# Patient Record
Sex: MALE | Race: WHITE | NOT HISPANIC OR LATINO | URBAN - METROPOLITAN AREA
[De-identification: names, ages, dates, MRNs, and addresses within clinical notes are randomized per-mention and may not be internally consistent; named-entity substitution may affect disease eponyms.]

---

## 2023-11-02 ENCOUNTER — APPOINTMENT (RX ONLY)
Dept: URBAN - METROPOLITAN AREA CLINIC 12 | Facility: CLINIC | Age: 73
Setting detail: DERMATOLOGY
End: 2023-11-02

## 2023-11-02 DIAGNOSIS — L81.4 OTHER MELANIN HYPERPIGMENTATION: ICD-10-CM

## 2023-11-02 DIAGNOSIS — D22 MELANOCYTIC NEVI: ICD-10-CM

## 2023-11-02 DIAGNOSIS — L82.1 OTHER SEBORRHEIC KERATOSIS: ICD-10-CM

## 2023-11-02 DIAGNOSIS — D18.0 HEMANGIOMA: ICD-10-CM

## 2023-11-02 DIAGNOSIS — L57.0 ACTINIC KERATOSIS: ICD-10-CM

## 2023-11-02 PROBLEM — D18.01 HEMANGIOMA OF SKIN AND SUBCUTANEOUS TISSUE: Status: ACTIVE | Noted: 2023-11-02

## 2023-11-02 PROBLEM — D22.5 MELANOCYTIC NEVI OF TRUNK: Status: ACTIVE | Noted: 2023-11-02

## 2023-11-02 PROBLEM — D22.62 MELANOCYTIC NEVI OF LEFT UPPER LIMB, INCLUDING SHOULDER: Status: ACTIVE | Noted: 2023-11-02

## 2023-11-02 PROBLEM — D22.61 MELANOCYTIC NEVI OF RIGHT UPPER LIMB, INCLUDING SHOULDER: Status: ACTIVE | Noted: 2023-11-02

## 2023-11-02 PROCEDURE — ? FULL BODY SKIN EXAM

## 2023-11-02 PROCEDURE — 99203 OFFICE O/P NEW LOW 30 MIN: CPT | Mod: 25

## 2023-11-02 PROCEDURE — ? COUNSELING

## 2023-11-02 PROCEDURE — ? LIQUID NITROGEN

## 2023-11-02 PROCEDURE — ? SUNSCREEN RECOMMENDATIONS

## 2023-11-02 PROCEDURE — 17003 DESTRUCT PREMALG LES 2-14: CPT

## 2023-11-02 PROCEDURE — 17000 DESTRUCT PREMALG LESION: CPT

## 2023-11-02 ASSESSMENT — LOCATION SIMPLE DESCRIPTION DERM
LOCATION SIMPLE: ABDOMEN
LOCATION SIMPLE: RIGHT POSTERIOR UPPER ARM
LOCATION SIMPLE: RIGHT EAR
LOCATION SIMPLE: RIGHT SHOULDER
LOCATION SIMPLE: LEFT POSTERIOR UPPER ARM
LOCATION SIMPLE: RIGHT UPPER BACK
LOCATION SIMPLE: LEFT UPPER BACK

## 2023-11-02 ASSESSMENT — LOCATION DETAILED DESCRIPTION DERM
LOCATION DETAILED: RIGHT ANTERIOR SHOULDER
LOCATION DETAILED: RIGHT SUPERIOR UPPER BACK
LOCATION DETAILED: LEFT DISTAL POSTERIOR UPPER ARM
LOCATION DETAILED: RIGHT SUPERIOR HELIX
LOCATION DETAILED: LEFT RIB CAGE
LOCATION DETAILED: RIGHT DISTAL POSTERIOR UPPER ARM
LOCATION DETAILED: LEFT MID-UPPER BACK

## 2023-11-02 ASSESSMENT — LOCATION ZONE DERM
LOCATION ZONE: ARM
LOCATION ZONE: TRUNK
LOCATION ZONE: EAR

## 2023-11-02 NOTE — PROCEDURE: SUNSCREEN RECOMMENDATIONS
Products Recommended: SPF 30, reapplication every 2 hours or after swimming and sweating.\\nVit C Sunscreen\\nCeraVe AM\\nTinted SPF\\nBanana Boat Body
General Sunscreen Counseling: I recommended a broad spectrum sunscreen with a SPF of 30 or higher.  I explained that SPF 30 sunscreens block approximately 97 percent of the sun's harmful rays.  Sunscreens should be applied at least 15 minutes prior to expected sun exposure and then every 2 hours after that as long as sun exposure continues. If swimming or exercising sunscreen should be reapplied every 45 minutes to an hour after getting wet or sweating.  One ounce, or the equivalent of a shot glass full of sunscreen, is adequate to protect the skin not covered by a bathing suit. I also recommended a lip balm with a sunscreen as well. Sun protective clothing can be used in lieu of sunscreen but must be worn the entire time you are exposed to the sun's rays.  \\n
Detail Level: Detailed
stretcher

## 2023-11-02 NOTE — PROCEDURE: LIQUID NITROGEN
Duration Of Freeze Thaw-Cycle (Seconds): 6
Post-Care Instructions: I reviewed with the patient in detail post-care instructions. Patient is to wear sunprotection, and avoid picking at any of the treated lesions. Pt may apply Vaseline to crusted or scabbing areas.
Render In Bullet Format When Appropriate: No
Detail Level: Zone
Number Of Freeze-Thaw Cycles: 2 freeze-thaw cycles
Total Number Of Aks Treated: 2
Render Post-Care Instructions In Note?: yes
Consent: The patient's verbal and/or written consent was obtained including but not limited to risks of crusting, scabbing, blistering, scarring, darker or lighter pigmentary change, recurrence, incomplete removal and infection.

## 2024-11-06 ENCOUNTER — APPOINTMENT (RX ONLY)
Dept: URBAN - METROPOLITAN AREA CLINIC 12 | Facility: CLINIC | Age: 74
Setting detail: DERMATOLOGY
End: 2024-11-06

## 2024-11-06 DIAGNOSIS — L81.4 OTHER MELANIN HYPERPIGMENTATION: ICD-10-CM

## 2024-11-06 DIAGNOSIS — D22 MELANOCYTIC NEVI: ICD-10-CM

## 2024-11-06 DIAGNOSIS — L57.0 ACTINIC KERATOSIS: ICD-10-CM

## 2024-11-06 PROBLEM — D22.5 MELANOCYTIC NEVI OF TRUNK: Status: ACTIVE | Noted: 2024-11-06

## 2024-11-06 PROCEDURE — ? LIQUID NITROGEN

## 2024-11-06 PROCEDURE — 17003 DESTRUCT PREMALG LES 2-14: CPT

## 2024-11-06 PROCEDURE — ? COUNSELING

## 2024-11-06 PROCEDURE — ? FULL BODY SKIN EXAM

## 2024-11-06 PROCEDURE — 17000 DESTRUCT PREMALG LESION: CPT

## 2024-11-06 PROCEDURE — ? TREATMENT REGIMEN

## 2024-11-06 PROCEDURE — 99213 OFFICE O/P EST LOW 20 MIN: CPT | Mod: 25

## 2024-11-06 ASSESSMENT — LOCATION DETAILED DESCRIPTION DERM
LOCATION DETAILED: LEFT LATERAL BUCCAL CHEEK
LOCATION DETAILED: MID-FRONTAL SCALP
LOCATION DETAILED: RIGHT CENTRAL ZYGOMA
LOCATION DETAILED: SUPERIOR THORACIC SPINE

## 2024-11-06 ASSESSMENT — LOCATION SIMPLE DESCRIPTION DERM
LOCATION SIMPLE: ANTERIOR SCALP
LOCATION SIMPLE: LEFT CHEEK
LOCATION SIMPLE: UPPER BACK
LOCATION SIMPLE: RIGHT ZYGOMA

## 2024-11-06 ASSESSMENT — LOCATION ZONE DERM
LOCATION ZONE: SCALP
LOCATION ZONE: TRUNK
LOCATION ZONE: FACE

## 2024-11-06 NOTE — PROCEDURE: COUNSELING
Sunscreen Recommendations: Monitor not susp appearing today
Detail Level: Generalized
Detail Level: Zone

## 2024-11-06 NOTE — PROCEDURE: LIQUID NITROGEN
Detail Level: Detailed
Consent: The patient's consent was obtained including but not limited to risks of crusting, scabbing, blistering, scarring, darker or lighter pigmentary change, recurrence, incomplete removal and infection.
Post-Care Instructions: I reviewed with the patient in detail post-care instructions. Patient is to wear sunprotection, and avoid picking at any of the treated lesions. Pt may apply Vaseline to crusted or scabbing areas.
Render Note In Bullet Format When Appropriate: No
Show Aperture Variable?: Yes
Duration Of Freeze Thaw-Cycle (Seconds): 20
Number Of Freeze-Thaw Cycles: 1 freeze-thaw cycle

## 2025-05-12 ENCOUNTER — APPOINTMENT (OUTPATIENT)
Dept: URBAN - METROPOLITAN AREA CLINIC 12 | Facility: CLINIC | Age: 75
Setting detail: DERMATOLOGY
End: 2025-05-12

## 2025-05-12 PROBLEM — C44.42 SQUAMOUS CELL CARCINOMA OF SKIN OF SCALP AND NECK: Status: ACTIVE | Noted: 2025-05-12

## 2025-05-12 PROCEDURE — 17270 DSTR MAL LES S/N/H/F/G .5 /<: CPT

## 2025-05-12 PROCEDURE — ? BIOPSY BY SHAVE METHOD AND DESTRUCTION

## 2025-05-12 NOTE — HPI: EVALUATION OF SKIN LESION(S)
What Type Of Note Output Would You Prefer (Optional)?: Bullet Format
Hpi Title: Evaluation of a Skin Lesion
How Severe Are Your Spot(S)?: moderate
Have Your Spot(S) Been Treated In The Past?: has not been treated
Additional History: Pt. Concerned with lesion top of head.\\nDeclines FBe\\nDeclines chaperone

## 2025-05-12 NOTE — PROCEDURE: BIOPSY BY SHAVE METHOD AND DESTRUCTION
Detail Level: Detailed
Biopsy Type: H and E
Bill As?: Malignant Destruction
Size Of Lesion In Cm (Optional): 0.3
Size Of Lesion After Curettage: 0.5
Anesthesia Type: 1% lidocaine with epinephrine
Hemostasis: Electrocautery
Destruction Type: electrodesiccation
Number Of Curettages: 3
Wound Care: No ointment
Lab: [id number]
Lab Facility: [id number]
Render Path Notes In Note?: No
Consent: The patient’s consent was obtained and risks were reviewed including but not limited to scarring, infection, bleeding, scabbing, incomplete removal, nerve damage and allergy to anesthesia.
Post-Care Instructions: I reviewed with the patient in detail post-care instructions. Patient is to keep the biopsy site dry overnight, and then apply aquaphor twice daily until healed.
Notification Instructions: Patient will be notified of biopsy results. However, patient instructed to call the office if not contacted within 2 weeks.
Billing Type: Third-Party Bill